# Patient Record
Sex: FEMALE
[De-identification: names, ages, dates, MRNs, and addresses within clinical notes are randomized per-mention and may not be internally consistent; named-entity substitution may affect disease eponyms.]

---

## 2018-04-11 ENCOUNTER — HOSPITAL ENCOUNTER (OUTPATIENT)
Dept: HOSPITAL 75 - RAD | Age: 38
End: 2018-04-11
Attending: NURSE PRACTITIONER
Payer: SELF-PAY

## 2018-04-11 DIAGNOSIS — K80.20: Primary | ICD-10-CM

## 2018-04-11 PROCEDURE — 76700 US EXAM ABDOM COMPLETE: CPT

## 2018-04-11 NOTE — DIAGNOSTIC IMAGING REPORT
PROCEDURE: US abdomen complete.



TECHNIQUE: Multiple real-time grayscale images were obtained over

the abdomen in various projections.



INDICATION: Right lower quadrant abdominal pain.



The visualized pancreas is unremarkable. The liver is normal in

size. No discrete liver mass is identified. The gallbladder

contains a small approximately 6 mm mobile stone. No wall

thickening or pericholecystic fluid is seen. No biliary duct

dilatation is identified. The extrahepatic duct was obscured. The

spleen is normal in size. Aorta and IVC are unremarkable. The

right and left kidneys are unremarkable. There is no ascites. 



IMPRESSION:

1. Cholelithiasis without evidence of acute cholecystitis.



Dictated by: 



  Dictated on workstation # [id number]

## 2018-10-23 ENCOUNTER — HOSPITAL ENCOUNTER (OUTPATIENT)
Dept: HOSPITAL 75 - CARD | Age: 38
End: 2018-10-23
Attending: NURSE PRACTITIONER
Payer: SELF-PAY

## 2018-10-23 DIAGNOSIS — E05.90: Primary | ICD-10-CM

## 2018-10-23 PROCEDURE — 78014 THYROID IMAGING W/BLOOD FLOW: CPT

## 2018-10-24 NOTE — DIAGNOSTIC IMAGING REPORT
INDICATION: Hyperthyroidism.



Patient was administered 200 ?Ci of I-123 orally and a 4 hour and

24-hour thyroid uptake as well as thyroid scan was performed.



4 hour uptake is 102% and 24 hour thyroid uptake is 94%. Thyroid

scan shows homogeneous uptake throughout both lobes of the

thyroid gland. No definite hot or cold nodules are seen.



IMPRESSION:

1. Significantly elevated 24-hour thyroid uptake consistent with

hyperthyroidism. No hot or cold nodules are seen.



Dictated by: 



  Dictated on workstation # [id number]

## 2019-01-09 ENCOUNTER — HOSPITAL ENCOUNTER (OUTPATIENT)
Dept: HOSPITAL 75 - ONC | Age: 39
LOS: 47 days | Discharge: HOME | End: 2019-02-25
Attending: RADIOLOGY
Payer: SELF-PAY

## 2019-01-09 ENCOUNTER — HOSPITAL ENCOUNTER (OUTPATIENT)
Dept: HOSPITAL 75 - CARD | Age: 39
End: 2019-01-09
Attending: RADIOLOGY
Payer: SELF-PAY

## 2019-01-09 DIAGNOSIS — E05.90: Primary | ICD-10-CM

## 2019-01-09 PROCEDURE — 84703 CHORIONIC GONADOTROPIN ASSAY: CPT

## 2019-01-09 PROCEDURE — 79005 NUCLEAR RX ORAL ADMIN: CPT

## 2019-01-09 PROCEDURE — 36415 COLL VENOUS BLD VENIPUNCTURE: CPT
